# Patient Record
Sex: MALE | Race: BLACK OR AFRICAN AMERICAN | URBAN - METROPOLITAN AREA
[De-identification: names, ages, dates, MRNs, and addresses within clinical notes are randomized per-mention and may not be internally consistent; named-entity substitution may affect disease eponyms.]

---
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
## 2019-04-11 ENCOUNTER — HISTORICAL (OUTPATIENT)
Dept: ADMINISTRATIVE | Facility: HOSPITAL | Age: 19
End: 2019-04-11

## 2019-04-11 LAB
BUN SERPL-MCNC: 11 MG/DL (ref 7–18)
CALCIUM SERPL-MCNC: 9.3 MG/DL (ref 8.5–10.1)
CHLORIDE SERPL-SCNC: 104 MMOL/L (ref 98–107)
CO2 SERPL-SCNC: 31 MMOL/L (ref 21–32)
CREAT SERPL-MCNC: 0.9 MG/DL (ref 0.6–1.3)
CREAT/UREA NIT SERPL: 12
ERYTHROCYTE [DISTWIDTH] IN BLOOD BY AUTOMATED COUNT: 12.4 % (ref 11.5–14.5)
GLUCOSE SERPL-MCNC: 79 MG/DL (ref 74–106)
HCT VFR BLD AUTO: 41.9 % (ref 40–51)
HGB BLD-MCNC: 13.9 GM/DL (ref 13.5–17.5)
MCH RBC QN AUTO: 29.4 PG (ref 26–34)
MCHC RBC AUTO-ENTMCNC: 33.2 GM/DL (ref 31–37)
MCV RBC AUTO: 88.8 FL (ref 80–100)
PLATELET # BLD AUTO: 194 X10(3)/MCL (ref 130–400)
PMV BLD AUTO: 11.2 FL (ref 7.4–10.4)
POTASSIUM SERPL-SCNC: 4.2 MMOL/L (ref 3.5–5.1)
RBC # BLD AUTO: 4.72 X10(6)/MCL (ref 4.5–5.9)
SODIUM SERPL-SCNC: 139 MMOL/L (ref 136–145)
WBC # SPEC AUTO: 3.5 X10(3)/MCL (ref 4.5–11)

## 2019-04-17 ENCOUNTER — HISTORICAL (OUTPATIENT)
Dept: SURGERY | Facility: HOSPITAL | Age: 19
End: 2019-04-17

## 2022-04-12 ENCOUNTER — HISTORICAL (OUTPATIENT)
Dept: ADMINISTRATIVE | Facility: HOSPITAL | Age: 22
End: 2022-04-12

## 2022-04-30 VITALS
WEIGHT: 130.06 LBS | BODY MASS INDEX: 19.71 KG/M2 | DIASTOLIC BLOOD PRESSURE: 78 MMHG | SYSTOLIC BLOOD PRESSURE: 110 MMHG | HEIGHT: 68 IN | OXYGEN SATURATION: 100 %

## 2022-04-30 NOTE — OP NOTE
DATE OF SURGERY:        SURGEON:  Jaime Romero MD    attending physician:  Jaime Romero MD    PREOPERATIVE DIAGNOSIS:  Right facial keloid, 4 cm.    POSTOPERATIVE DIAGNOSIS:  Right facial keloid, 4 cm.    PROCEDURES:    1. Excision of right facial keloid, 4 cm.  2. Complex layered facial closure right face, 4 cm.  3. Injection of 1 cc of 40 mg Kenalog.    INDICATIONS FOR PROCEDURE:  Mr. Veronica Jaramillo, a 18-year-old male, with a history of a slow growing keloid is causing him psychological distress.  He presents for excision and closure with Kenalog to prevent recurrence.    ANESTHESIA:  General.    COMPLICATIONS:  None.    PROCEDURE IN DETAIL:  The patient was placed under LMA anesthesia, and prepped and draped in the usual sterile fashion.  An elliptical incision was made using a 15 blade scalpel around the entirety of the keloid after 1% lidocaine with epinephrine was instilled in the base of the wound.  This was then removed using in its entirety the Bovie cautery, 4 cm in total length.  The deep tissues were then reapproximated including the platysmal layer and the SMAS using an interrupted 3-0 Vicryl suture.  Then, the deep dermis was then closed using interrupted 3-0 Vicryl suture and finally the skin was closed using running 4-0 PDS     suture.  1 cc of 40 mg of Kenalog was diluted into 3 cc and injected at the base of the wound to prevent recurrence.  There were no complications.  I was scrubbed and present throughout procedure.      ______________________________  Jaime Romero MD    BF/MODL  DD:  04/17/2019  Time:  07:34AM  DT:  04/17/2019  Time:  07:59AM  Job #:  [id number]

## 2022-05-05 NOTE — HISTORICAL [facility] CERNER
This is a historical note converted from [employer]. Formatting and pictures may have been removed.  Please reference [employer] for original formatting and attached multimedia. Indication for Surgery  19 y/o man with right facial keloid  Preoperative Diagnosis  Right facial keloid  Postoperative Diagnosis  Right facial keloid  Operation  Excision of Right Facial Keloid  Surgeon(s)  MD Miky Goncalves MD  Assistant  Carlo Villalobos, MS3  Anesthesia  General  Estimated Blood Loss  2mL  Findings  Right facial keloid  Specimen(s)  Keloid  Complications  None  Technique  Risks, benefits, and alternatives were explained to the patient. Written consent was confirmed and patient was taken to the operating room. General anesthesia was induced and patient was prepped and draped in the usual sterile fashion. Local anesthesia was injected circumferentially along the keloid in order to reduce bleeding. Margins were marked and an elliptical incision was performed containing the entire right facial keloid. Dissection was performed deep to the keloid with electrocautery. The specimen was removed from the field and?hemostasis was achieved with electrocautery. The site was closed with Vicryl deep dermals and a PDS running subcuticular at the skin. The patient tolerated the procedure well, was extubated, and taken to PACU in stable condition. Dr. Romero was present for the operation.